# Patient Record
Sex: FEMALE | Employment: OTHER | URBAN - METROPOLITAN AREA
[De-identification: names, ages, dates, MRNs, and addresses within clinical notes are randomized per-mention and may not be internally consistent; named-entity substitution may affect disease eponyms.]

---

## 2017-02-23 ENCOUNTER — CARE COORDINATION (OUTPATIENT)
Dept: CASE MANAGEMENT | Facility: CLINIC | Age: 62
End: 2017-02-23

## 2017-03-01 ENCOUNTER — CARE COORDINATION (OUTPATIENT)
Dept: CASE MANAGEMENT | Facility: CLINIC | Age: 62
End: 2017-03-01

## 2017-03-15 ENCOUNTER — CARE COORDINATION (OUTPATIENT)
Dept: CASE MANAGEMENT | Facility: CLINIC | Age: 62
End: 2017-03-15

## 2017-04-06 ENCOUNTER — CARE COORDINATION (OUTPATIENT)
Dept: CASE MANAGEMENT | Facility: CLINIC | Age: 62
End: 2017-04-06

## 2017-06-06 ENCOUNTER — CARE COORDINATION (OUTPATIENT)
Dept: CASE MANAGEMENT | Facility: CLINIC | Age: 62
End: 2017-06-06

## 2017-06-06 NOTE — PROGRESS NOTES
Clinic Care Coordination Contact  OUTREACH    Referral Information:  Referral Source: PCP  Reason for Contact: Introducing new CC  Care Conference: No     Universal Utilization:   ED Visits in last year: 0  Hospital visits in last year: 0  Last PCP appointment: 03/01/17  Missed Appointments: 1      Clinical Concerns:  Current Medical Concerns: not identified   Current Behavioral Concerns: not identified  Education Provided to patient: none provided    Medication Management:  Not addressed.    Functional Status:  Mobility Status: Independent  Equipment Currently Used at Home: none  Transportation: she drives herself     Psychosocial:  Current living arrangement:: I live alone.      Resources and Interventions:    Advanced Care Plans/Directives on file:: No  Referrals Placed:  (na)  Barriers: none identified  Strengths: Has appointment next week with PCP and will try to meet with CC then. She is driving and could not identify the date and time.   Patient/Caregiver understanding: Patient understands the plan.   Frequency of Care Coordination: every 2-3 weeks  Upcoming appointment: 06/13/17     Plan: Meet with patient at PCP appointment next week.

## 2017-06-13 ENCOUNTER — CARE COORDINATION (OUTPATIENT)
Dept: CASE MANAGEMENT | Facility: CLINIC | Age: 62
End: 2017-06-13

## 2017-07-07 ENCOUNTER — CARE COORDINATION (OUTPATIENT)
Dept: CASE MANAGEMENT | Facility: CLINIC | Age: 62
End: 2017-07-07

## 2017-07-31 ENCOUNTER — CARE COORDINATION (OUTPATIENT)
Dept: CARE COORDINATION | Facility: CLINIC | Age: 62
End: 2017-07-31

## 2021-02-15 ENCOUNTER — TRANSFERRED RECORDS (OUTPATIENT)
Dept: HEALTH INFORMATION MANAGEMENT | Facility: CLINIC | Age: 66
End: 2021-02-15

## 2021-02-15 LAB — HBA1C MFR BLD: 12 % (ref 4.8–5.6)

## 2021-02-16 NOTE — PROGRESS NOTES
> From: TERESO MENON   > To: DIMPLE, PHARM D   > Sent: 2/16/2021 3:18 PM  >  prior attempts to contact didn't succeed.  a consult would be good.  (note, she lost her adult son and brother to covid in 2020, so it was a really tough year)    Dr. Marily PEDROZAFP

## 2021-02-23 ENCOUNTER — OFFICE VISIT (OUTPATIENT)
Dept: PHARMACY | Facility: PHYSICIAN GROUP | Age: 66
End: 2021-02-23

## 2021-02-23 DIAGNOSIS — E11.69 TYPE 2 DIABETES MELLITUS WITH OTHER SPECIFIED COMPLICATION, WITH LONG-TERM CURRENT USE OF INSULIN (H): Primary | ICD-10-CM

## 2021-02-23 DIAGNOSIS — Z79.4 TYPE 2 DIABETES MELLITUS WITH OTHER SPECIFIED COMPLICATION, WITH LONG-TERM CURRENT USE OF INSULIN (H): Primary | ICD-10-CM

## 2021-02-23 PROCEDURE — 99207 PR NO CHARGE LOS: CPT | Performed by: PHARMACIST

## 2021-02-23 ASSESSMENT — MIFFLIN-ST. JEOR: SCORE: 1545.86

## 2021-02-23 NOTE — PROGRESS NOTES
Clinical Pharmacy Consult:                                                    Dayami Saldaña is a 65 year old female coming in for a clinical pharmacist consult.  She was referred to me from Dr. Calixto.     Reason for Consult: Uncontrolled diabetes.  Patient's insurance does not cover MTM visits.  She opts for an MTM consult for diabetes education.    Adherence: Patient has had a very stressful year.  Her son and brother both  due to heart complications after Covid.  Patient reports in her grief she stopped caring about her health.  Recently she realized that uncontrolled diabetes was causing her to feel physically unwell and she is working on getting back on track.  Patient was off the Victoza for a period of time and recently restarted it.  She has not started taking Jardiance because wants to focus on taking her current medications as prescribed rather than adding new medications.  She also expresses concern about the side effects of Jardiance, however does not wish to discuss this further today because she is not ready to start it at this time. Patient asks if insulin and Victoza can be taken at the same time.  She's been trying to space the injections apart from each other which makes administration timing difficult.      Type 2 Diabetes:  Currently taking Basaglar 40mg at bedtime, Humalog 10 units 3 times daily with meals, and Victoza 1.8mg daily. Patient is not experiencing side effects, denies any nausea or GI issues with Victoza dose increase.  Blood sugar monitoring: Continuous Glucose Monitor - Freestyle Lexie, prefers to use reader instead of cell phone. Time in ranges: 68% very high, 22% high, 10% in target range, 0% low, 0% very low.  Average glucose: 296 mg/dL. Time CGM is active 37%.      Symptoms of low blood sugar? none  Symptoms of high blood sugar? fatigue and neuropathy.   Diet/Exercise: Yesterday was a reset for her to get back on track with her health. Started going back to [employer] and eating more vegetables/fruit.  Aspirin: Taking 81mg daily and denies side effects  Statin: Taking rosuvastatin 10 mg daily.  ACEi/ARB: Taking losartan 100 mg daily.  Last microalbumin was >300 mg/g on 11/23/2020.  Last A1c was 12.0% on 2/15/2021.  Previous A1c was 10.7% on 11/23/2020.  Last BMP on 2/15/2021: SCr 0.99 mg/dL, eGFR 70 mL/min, and electrolytes WNL.    Today's Vitals: /80   Pulse 86   Ht 5' 3\" (1.6 m)   Wt 225 lb 4 oz (102.2 kg)   BMI 39.90 kg/m      Plan:  1.  Patient to continue current diabetes regimen including: Basaglar 40 units daily, Humalog 10 units 3 times daily with meals, and Victoza 1.8 mg daily. Patient to work on maintaining medication adherence and healthy lifestyle modifications.   2. Patient education provided today. It's ok to inject Victoza and insulin at the same time.  Must scan blood sugars at least every 8 hours in order to capture all data.  3. Follow-up with Dr. Calixto in 2 weeks to recheck blood sugars and MTM as needed.      Yahaira Billy, PharmD  Medication Therapy Management Pharmacist  Pager: [phone]  "

## 2021-02-28 RX ORDER — AMLODIPINE BESYLATE 10 MG/1
10 TABLET ORAL DAILY
COMMUNITY

## 2021-02-28 RX ORDER — ASPIRIN 81 MG/1
81 TABLET ORAL DAILY
COMMUNITY

## 2021-02-28 RX ORDER — FLASH GLUCOSE SENSOR
KIT MISCELLANEOUS
COMMUNITY

## 2021-02-28 RX ORDER — INSULIN GLARGINE 100 [IU]/ML
40 INJECTION, SOLUTION SUBCUTANEOUS DAILY
COMMUNITY

## 2021-02-28 RX ORDER — LIRAGLUTIDE 6 MG/ML
1.8 INJECTION SUBCUTANEOUS DAILY
COMMUNITY

## 2021-02-28 RX ORDER — LOSARTAN POTASSIUM AND HYDROCHLOROTHIAZIDE 25; 100 MG/1; MG/1
1 TABLET ORAL DAILY
COMMUNITY

## 2021-02-28 RX ORDER — ROSUVASTATIN CALCIUM 10 MG/1
10 TABLET, COATED ORAL DAILY
COMMUNITY

## 2021-02-28 RX ORDER — INSULIN LISPRO 100 [IU]/ML
10 INJECTION, SOLUTION INTRAVENOUS; SUBCUTANEOUS
COMMUNITY

## 2021-04-15 ENCOUNTER — PATIENT OUTREACH (OUTPATIENT)
Dept: CARE COORDINATION | Facility: CLINIC | Age: 66
End: 2021-04-15

## 2021-04-15 DIAGNOSIS — E11.9 TYPE 2 DIABETES MELLITUS (H): Primary | ICD-10-CM

## 2021-04-15 ASSESSMENT — ACTIVITIES OF DAILY LIVING (ADL): DEPENDENT_IADLS:: INDEPENDENT

## 2021-04-15 NOTE — PROGRESS NOTES
Clinic Care Coordination Contact    Clinic Care Coordination Contact  OUTREACH    Referral Information:  Referral Source: MTM         Chief Complaint   Patient presents with     Clinic Care Coordination - Initial        Universal Utilization: na  Clinic Utilization  Difficulty keeping appointments:: No  Compliance Concerns: No  No-Show Concerns: No  No PCP office visit in Past Year: No  Utilization    Last refreshed: 4/15/2021 11:05 AM: Hospital Admissions 0           Last refreshed: 4/15/2021 11:05 AM: ED Visits 0           Last refreshed: 4/15/2021 11:05 AM: No Show Count (past year) 0              Current as of: 4/15/2021 11:05 AM              Clinical Concerns:  Current Medical Concerns:  Type 11 Diabetes    Current Behavioral Concerns: depression due to loss of son and brother    Education Provided to patient: Good Rx and [place]   Pain  Pain (GOAL):: No  Health Maintenance Reviewed: Up to date  Clinical Pathway: None    Medication Management:Humalog 10 units 3 times daily with meals, and Victoza 1.8 mg daily.    Functional Status:  Dependent ADLs:: Independent  Dependent IADLs:: Independent  Bed or wheelchair confined:: No  Fallen 2 or more times in the past year?: No  Any fall with injury in the past year?: No    Living Situation:  Current living arrangement:: I live alone  Type of residence:: Apartment    Lifestyle & Psychosocial Needs:        Diet:: Diabetic diet  Inadequate nutrition (GOAL):: No  Tube Feeding: No  Significant changes in sleep pattern (GOAL): No  Transportation means:: Regular car     Yarsani or spiritual beliefs that impact treatment:: No  Mental health DX:: No  Mental health management concern (GOAL):: No  Chemical Dependency Status: No Current Concerns   Socioeconomic History     Marital status: Single     Spouse name: Not on file     Number of children: Not on file     Years of education: Not on file     Highest education level: Not on file                Resources  and Interventions:  Current Resources:  Shared [place] Insulin Safety net program and Good rx for Proaire and Freestyle monitor     Community Resources: None  Supplies used at home:: Diabetic Supplies  Equipment Currently Used at Home: none  Employment Status: employed part-time)   )         Referrals Placed: Community Resources, Financial Services     Goals:       Patient/Caregiver understanding: yes    Outreach Frequency: 2 weeks      Plan: [facility] [place] phoned Dayami to disccus current inssurance lapst. She states that she currently does not have insurance but should have M[place]ure May 1. Dayami stated that she will run out of insulin by May 1rst. She does not have the Freestyle monitor b/c she needed to pay for other medications before that. She would like to have that filled if possible. She is also in eed of Basaglar and Humalog as well as the Proair inhaler.    [facility] called her pharmacy[facility] in [place] and asked for their patient assistance program. They had no suggestions except Good Rx. Roberts Chapel researched and found the [place] Insulin Safety Net Program and informed her that I would be emailing her the application to bring to her pharmacy. I emailed Dayami Good Rx coupons for Proaire as well as for her FreeStyle Monitor.    Plan:Dayami to utilize [place] program application and Good Rx coupons , 2 [facility] [place] to follow up next week.

## 2021-04-22 ENCOUNTER — PATIENT OUTREACH (OUTPATIENT)
Dept: CARE COORDINATION | Facility: CLINIC | Age: 66
End: 2021-04-22

## 2021-04-22 NOTE — PROGRESS NOTES
Clinic Care Coordination Contact  [facility]/Voicemail       Clinical Data: Care Coordinator Outreach  Outreach attempted x 1.  Left message on patient's voicemail with call back information and requested return call.  Care Coordinator will try to reach patient again in 3-5 business days.

## 2021-04-29 ENCOUNTER — PATIENT OUTREACH (OUTPATIENT)
Dept: CARE COORDINATION | Facility: CLINIC | Age: 66
End: 2021-04-29

## 2021-04-29 NOTE — PROGRESS NOTES
Clinic Care Coordination Contact  [facility]/Voicemail       Clinical Data: Care Coordinator Outreach  Outreach attempted x 2.  Left message on patient's voicemail with call back information and requested return call.  . Care Coordinator will try to reach patient again in 3-5 business days.

## 2021-05-10 ENCOUNTER — TRANSFERRED RECORDS (OUTPATIENT)
Dept: HEALTH INFORMATION MANAGEMENT | Facility: CLINIC | Age: 66
End: 2021-05-10

## 2021-05-10 LAB — HBA1C MFR BLD: 10.6 % (ref 4.8–5.6)

## 2021-05-11 ENCOUNTER — PATIENT OUTREACH (OUTPATIENT)
Dept: CARE COORDINATION | Facility: CLINIC | Age: 66
End: 2021-05-11

## 2021-05-11 NOTE — PROGRESS NOTES
Clinic Care Coordination Contact  [facility]/Voicemail       Clinical Data: Care Coordinator Outreach  Outreach attempted x 3.  Left message on patient's voicemail with call back information and requested return call.  Care Coordinator will do no further outreaches at this time.

## 2021-08-03 ENCOUNTER — THERAPY VISIT (OUTPATIENT)
Dept: PHYSICAL THERAPY | Facility: CLINIC | Age: 66
End: 2021-08-03
Payer: COMMERCIAL

## 2021-08-03 DIAGNOSIS — M54.50 BILATERAL LOW BACK PAIN WITHOUT SCIATICA: ICD-10-CM

## 2021-08-03 PROCEDURE — 97110 THERAPEUTIC EXERCISES: CPT | Mod: GP | Performed by: PHYSICAL THERAPIST

## 2021-08-03 PROCEDURE — 97161 PT EVAL LOW COMPLEX 20 MIN: CPT | Mod: GP | Performed by: PHYSICAL THERAPIST

## 2021-08-03 PROCEDURE — 97112 NEUROMUSCULAR REEDUCATION: CPT | Mod: GP | Performed by: PHYSICAL THERAPIST

## 2021-08-03 NOTE — PROGRESS NOTES
Physical Therapy Initial Evaluation  Subjective:    Patient Health History  Dayami Cruz being seen for low back pain.     Problem began: 7/12/2021 (fell).   Problem occurred: 7/12/21 while in [employer] mobile walking to the restroom was looking up to see where to go and didn't see a stack of glossy posters on the floor and slipped and fell and landed completely on back.  Knew would be sore the next day so used ice the next day.  (Did have LBP in 2009 and therapy helped and was fine until this fall).  Also noting R ankle swelling since the fall.   Pain is reported as 8/10 on pain scale.  General health as reported by patient is good.  Pertinent medical history includes: high blood pressure, diabetes and overweight (R achilles rupture 2018- in boot for many months (thinks this fall has irritated the R ankle- swelling in am)).   Red flags:  Pain at rest/night.  Medical allergies: none.   Surgeries include:  None.    Current medications:  High blood pressure medication (Diabetes meds).    Current occupation is [employer].   Primary job tasks include:  Prolonged standing.                  Therapist Generated HPI Evaluation         Type of problem:  Lumbar.    This is a new condition.      Patient reports pain:  Lumbar spine right and lumbar spine left (R>L).  Pain is described as shooting and aching and is intermittent (constant/consistent by end of day, feels good in morning).  Pain radiates to:  Gluteals right and gluteals left (R>L). Pain is worse in the P.M..  Since onset symptoms are gradually worsening.  Associated symptoms:  Loss of motion/stiffness and loss of strength (fears falling down stairs ). Symptoms are exacerbated by sitting and standing (navigating stairs one step at a time and almost pulling railing off wall, usually works 8-12 hours, right now only doing 5 as not able to stand (standing 30-45 min max and needs to sit))  and relieved by ice, other and rest (putting breaks between clients at work,  massage).  Special tests included:  X-ray (mild arthritis L4-5).    Restrictions due to condition include:  Working in normal job without restrictions (self limiting, putting breaks between patients).  Barriers include:  Stairs (10-12 stairs in home and stairs to enter home (railing on R), lives with ).                        Objective:  System         Lumbar/SI Evaluation  ROM:    AROM Lumbar:   Flexion:          Mod/max loss reach to above knees, pain LB  Ext:                    Mod max loss pain to LB and buttocks   Side Bend:        Left:     Right:   Rotation:           Left:     Right:   Side Glide:        Left:  Min loss tightnes    Right:  Min loss          Lumbar Myotomes:  Lumbar myotomes: submaximal effort due to pain, breaks with all testing R>L.  T12-L3 (Hip Flex):  Left: 4    Right: 4-  L2-4 (Quads):  Left:  4    Right:  4-  L4 (Ankle DF):  Left:  4    Right:  3  L5 (Great Toe Ext): Left: 4    Right: 3   S1 (Toe Raise):  Left: 4    Right: 3-      Lumbar Dermtomes:  not assessed                Neural Tension/Mobility:      Left side:SLR or Slump  negative.   Right side:   SLR positive.  Right side:   Slump  negative.   Lumbar Palpation:    Tenderness present at Left:    Quadratus Lumborum and Erector Spinae  Tenderness present at Right: Quadratus Lumborum and Erector Spinae                                                       Miles Lumbar Evaluation    Posture:  Sitting: fair  Standing: fair    Lateral Shift: no  Correction of Posture: better  Other Observations: ZHAO unable to bring both knees up.  SKC painful no different after.  Reports painful at home if tries to lie prone    Test Movements:  FIS: During: increases  After: no worse  Pretest Movements: B LBP    EIS: During: increases  After: no worse    Repeat EIS: During: increases  After: no worse  Mechanical Response: no effect            Conclusion: trauma  Principle of Treatment:  Posture Correction: Educate keep neutral spine, use of  lumbar support, slouch/over-correct, over-correct and back off 10% to neutral unsupported sitting, pay attention if posture affects pain.        Other: Slouch/over-correct, hooklying rotation, supine abdominal #1 core stabilization, self MFR with tennis balls to B erector spinae                                       ROS    Assessment/Plan:    Patient is a 66 year old female with lumbar complaints.    Patient has the following significant findings with corresponding treatment plan.                Diagnosis 1:  Lower back pain after a fall    Pain -  hot/cold therapy, manual therapy, self management, education and home program  Decreased ROM/flexibility - manual therapy, therapeutic exercise, therapeutic activity and home program  Decreased strength - therapeutic exercise, therapeutic activities and home program  Decreased function - therapeutic activities and home program  Impaired posture - neuro re-education, therapeutic activities and home program    Therapy Evaluation Codes:   Cumulative Therapy Evaluation is: Low complexity.    Previous and current functional limitations:  (See Goal Flow Sheet for this information)    Short term and Long term goals: (See Goal Flow Sheet for this information)     Communication ability:  Patient appears to be able to clearly communicate and understand verbal and written communication and follow directions correctly.  Treatment Explanation - The following has been discussed with the patient:   RX ordered/plan of care  Anticipated outcomes  Possible risks and side effects  This patient would benefit from PT intervention to resume normal activities.   Rehab potential is good.    Frequency:  1 X week, once daily  Duration:  for 6 weeks  Discharge Plan:  Achieve all LTG.  Independent in home treatment program.  Reach maximal therapeutic benefit.    Please refer to the daily flowsheet for treatment today, total treatment time and time spent performing 1:1 timed codes.

## 2021-08-03 NOTE — LETTER
[facility]  [address]  [place] [address]  [phone]    2021    Re: Dayami Cruz   :   1955  MRN:  [id number]   REFERRING PHYSICIAN:   Linda MURRELL [facility]  Date of Initial Evaluation:  8/3/21  Visits:  Rxs Used: 1  Reason for Referral:  Bilateral low back pain without sciatica    EVALUATION SUMMARY  Physical Therapy Initial Evaluation  Subjective:  Patient Health History  Dayami Cruz being seen for low back pain.   Problem began: 2021 (fell).   Problem occurred: 21 while in T mobile walking to the restroom was looking up to see where to go and didn't see a stack of glossy posters on the floor and slipped and fell and landed completely on back.  Knew would be sore the next day so used ice the next day.  (Did have LBP in  and therapy helped and was fine until this fall).  Also noting R ankle swelling since the fall.   Pain is reported as 8/10 on pain scale.  General health as reported by patient is good.  Pertinent medical history includes: high blood pressure, diabetes and overweight (R achilles rupture 2018- in boot for many months (thinks this fall has irritated the R ankle- swelling in am)).   Red flags:  Pain at rest/night.  Medical allergies: none.   Surgeries include:  None.    Current medications:  High blood pressure medication (Diabetes meds).    Current occupation is [employer].   Primary job tasks include:  Prolonged standing.                Therapist Generated HPI Evaluation  Type of problem:  Lumbar.  This is a new condition.  Patient reports pain:  Lumbar spine right and lumbar spine left (R>L).  Pain is described as shooting and aching and is intermittent (constant/consistent by end of day, feels good in morning).  Pain radiates to:  Gluteals right and gluteals left (R>L). Pain is worse in the P.M..  Since onset  symptoms are gradually worsening.  Associated symptoms:  Loss of motion/stiffness and loss of strength (fears falling down stairs ). Symptoms are exacerbated by sitting and standing (navigating stairs one step at a time and almost pulling railing off wall, usually works 8-12 hours, right now only doing 5 as not able to stand (standing 30-45 min max and needs to sit))  and relieved by ice, other and rest (putting breaks between clients at work, massage).  Special tests included:  X-ray (mild arthritis L4-5).  Re: Dayami Cruz   :   1955    Restrictions due to condition include:  Working in normal job without restrictions (self limiting, putting breaks between patients).  Barriers include:  Stairs (10-12 stairs in home and stairs to enter home (railing on R), lives with ).         Lumbar/SI Evaluation  ROM:    AROM Lumbar:   Flexion:          Mod/max loss reach to above knees, pain LB  Ext:                    Mod max loss pain to LB and buttocks   Side Glide:        Left:  Min loss tightnes    Right:  Min loss  Lumbar Myotomes:  Lumbar myotomes: submaximal effort due to pain, breaks with all testing R>L.  T12-L3 (Hip Flex):  Left: 4    Right: 4-  L2-4 (Quads):  Left:  4    Right:  4-  L4 (Ankle DF):  Left:  4    Right:  3  L5 (Great Toe Ext): Left: 4    Right: 3   S1 (Toe Raise):  Left: 4    Right: 3-  Lumbar Dermtomes:  not assessed  Neural Tension/Mobility:    Left side:SLR or Slump  negative.   Right side:   SLR positive.  Right side:   Slump  negative.   Lumbar Palpation:    Tenderness present at Left:    Quadratus Lumborum and Erector Spinae  Tenderness present at Right: Quadratus Lumborum and Erector Spinae    Miles Lumbar Evaluation  Posture:  Sitting: fair  Standing: fair  Lateral Shift: no  Correction of Posture: better  Other Observations: ZHAO unable to bring both knees up.  SKC painful no different after.  Reports painful at home if tries to lie prone  Test Movements:  FIS: During:  increases  After: no worse  Pretest Movements: B LBP  EIS: During: increases  After: no worse    Repeat EIS: During: increases  After: no worse  Mechanical Response: no effect  Conclusion: trauma  Principle of Treatment:  Posture Correction: Educate keep neutral spine, use of lumbar support, slouch/over-correct, over-correct and back off 10% to neutral unsupported sitting, pay attention if posture affects pain.  Other: Slouch/over-correct, hooklying rotation, supine abdominal #1 core stabilization, self MFR with tennis balls to B erector spinae            Assessment/Plan:    Patient is a 66 year old female with lumbar complaints.    Patient has the following significant findings with corresponding treatment plan.                Diagnosis 1:  Lower back pain after a fall  Pain -  hot/cold therapy, manual therapy, self management, education and home program  Decreased ROM/flexibility - manual therapy, therapeutic exercise, therapeutic activity and home program  Decreased strength - therapeutic exercise, therapeutic activities and home program  Decreased function - therapeutic activities and home program  Impaired posture - neuro re-education, therapeutic activities and home program    Therapy Evaluation Codes:   Cumulative Therapy Evaluation is: Low complexity.  Previous and current functional limitations:  (See Goal Flow Sheet for this information)    Short term and Long term goals: (See Goal Flow Sheet for this information)     Communication ability:  Patient appears to be able to clearly communicate and understand verbal and written communication and follow directions correctly.  Treatment Explanation - The following has been discussed with the patient:     RX ordered/plan of care  Anticipated outcomes  Possible risks and side effects  This patient would benefit from PT intervention to resume normal activities.   Rehab potential is good.    Frequency:  1 X week, once daily  Duration:  for 6 weeks  Discharge Plan:   Achieve all LTG.  Independent in home treatment program.  Reach maximal therapeutic benefit.    Thank you for your referral.    INQUIRIES  Therapist: Ana Navarro PT  [facility]  [address] [address]  Phone: [phone]  Fax: [phone]

## 2021-08-09 ENCOUNTER — THERAPY VISIT (OUTPATIENT)
Dept: PHYSICAL THERAPY | Facility: CLINIC | Age: 66
End: 2021-08-09
Payer: COMMERCIAL

## 2021-08-09 DIAGNOSIS — M54.50 ACUTE BILATERAL LOW BACK PAIN WITHOUT SCIATICA: ICD-10-CM

## 2021-08-09 PROCEDURE — 97110 THERAPEUTIC EXERCISES: CPT | Mod: GP | Performed by: PHYSICAL THERAPIST

## 2021-08-09 PROCEDURE — 97140 MANUAL THERAPY 1/> REGIONS: CPT | Mod: GP | Performed by: PHYSICAL THERAPIST

## 2021-08-09 PROCEDURE — 97112 NEUROMUSCULAR REEDUCATION: CPT | Mod: GP | Performed by: PHYSICAL THERAPIST

## 2021-08-30 ENCOUNTER — THERAPY VISIT (OUTPATIENT)
Dept: PHYSICAL THERAPY | Facility: CLINIC | Age: 66
End: 2021-08-30
Payer: COMMERCIAL

## 2021-08-30 DIAGNOSIS — M54.50 ACUTE BILATERAL LOW BACK PAIN WITHOUT SCIATICA: ICD-10-CM

## 2021-08-30 PROCEDURE — 97110 THERAPEUTIC EXERCISES: CPT | Mod: GP | Performed by: PHYSICAL THERAPIST

## 2021-08-30 PROCEDURE — 97112 NEUROMUSCULAR REEDUCATION: CPT | Mod: GP | Performed by: PHYSICAL THERAPIST

## 2021-08-30 PROCEDURE — 97140 MANUAL THERAPY 1/> REGIONS: CPT | Mod: GP | Performed by: PHYSICAL THERAPIST

## 2021-09-29 ENCOUNTER — TRANSFERRED RECORDS (OUTPATIENT)
Dept: HEALTH INFORMATION MANAGEMENT | Facility: CLINIC | Age: 66
End: 2021-09-29
Payer: COMMERCIAL

## 2021-09-29 LAB — HBA1C MFR BLD: 8.9 % (ref 4.8–5.6)

## 2021-10-15 PROBLEM — M54.50 BILATERAL LOW BACK PAIN WITHOUT SCIATICA: Status: RESOLVED | Noted: 2021-08-03 | Resolved: 2021-10-15

## 2021-10-15 NOTE — PROGRESS NOTES
Discharge Note    Progress reporting period is from initial evaluation date (please see noted date below) to Aug 30, 2021.  Linked Episodes   Type: Episode: Status: Noted: Resolved: Last update: Updated by:   PHYSICAL THERAPY LBP s/p fall 8/3/21 Active 8/3/2021  8/30/2021 11:29 AM Ana Navarro PT      Comments:       Dayami failed to follow up and current status is unknown.  Please see information below for last relevant information on current status.  Patient seen for 3 visits.    SUBJECTIVE  Subjective changes noted by patient:  I have been doing a little travel.  I have some general tightness.  I have had some swelling in the right shoulder.  I see the MD on 9/29/2021.  only a couple of spasms in the back .  I had to take some tylenol, low back and up the right side.    .  Current pain level is 7/10.     Previous pain level was  8/10.   Changes in function:  Yes (See Goal flowsheet attached for changes in current functional level)  Adverse reaction to treatment or activity: None    OBJECTIVE  Changes noted in objective findings: TROM flexion knees, extension moderate      ASSESSMENT/PLAN  Diagnosis: R>L Lower back pain s/p fall   Updated problem list and treatment plan:   Pain - HEP  Decreased ROM/flexibility - HEP  Decreased function - HEP  STG/LTGs have been met or progress has been made towards goals:  Yes, please see goal flowsheet for most current information  Assessment of Progress: current status is unknown.    Last current status: Pt is progressing slower than anticipated   Self Management Plans:  HEP  I have re-evaluated this patient and find that the nature, scope, duration and intensity of the therapy is appropriate for the medical condition of the patient.  Dayami continues to require the following intervention to meet STG and LTG's:  HEP.    Recommendations:  Discharge with current home program.  Patient to follow up with MD as needed.    Please refer to the daily flowsheet for treatment today, total  treatment time and time spent performing 1:1 timed codes.

## 2022-01-25 ENCOUNTER — TRANSFERRED RECORDS (OUTPATIENT)
Dept: HEALTH INFORMATION MANAGEMENT | Facility: CLINIC | Age: 67
End: 2022-01-25
Payer: COMMERCIAL

## 2022-01-25 LAB — HBA1C MFR BLD: 11.2 % (ref 4.8–5.6)

## 2022-02-28 VITALS
BODY MASS INDEX: 39.91 KG/M2 | HEIGHT: 63 IN | WEIGHT: 225.25 LBS | SYSTOLIC BLOOD PRESSURE: 144 MMHG | HEART RATE: 86 BPM | DIASTOLIC BLOOD PRESSURE: 90 MMHG

## 2022-04-26 ENCOUNTER — TRANSFERRED RECORDS (OUTPATIENT)
Dept: HEALTH INFORMATION MANAGEMENT | Facility: CLINIC | Age: 67
End: 2022-04-26
Payer: COMMERCIAL

## 2022-04-26 LAB — HBA1C MFR BLD: 10.6 % (ref 4.8–5.6)

## 2022-05-31 ENCOUNTER — TELEPHONE (OUTPATIENT)
Dept: PHARMACY | Facility: PHYSICIAN GROUP | Age: 67
End: 2022-05-31
Payer: COMMERCIAL

## 2022-05-31 NOTE — TELEPHONE ENCOUNTER
I called patient to offer an MTM visit. Left a voicemail to call clinic back to schedule appt.    Yahaira Billy, PharmD  Medication Therapy Management Pharmacist  [phone]

## 2022-08-09 ENCOUNTER — TRANSFERRED RECORDS (OUTPATIENT)
Dept: HEALTH INFORMATION MANAGEMENT | Facility: CLINIC | Age: 67
End: 2022-08-09

## 2022-08-09 LAB — HBA1C MFR BLD: 9.8 % (ref 4.8–5.6)

## 2024-10-10 ENCOUNTER — TRANSFERRED RECORDS (OUTPATIENT)
Dept: HEALTH INFORMATION MANAGEMENT | Facility: CLINIC | Age: 69
End: 2024-10-10
Payer: COMMERCIAL

## 2024-10-14 ENCOUNTER — MEDICAL CORRESPONDENCE (OUTPATIENT)
Dept: HEALTH INFORMATION MANAGEMENT | Facility: CLINIC | Age: 69
End: 2024-10-14
Payer: COMMERCIAL

## 2024-10-14 DIAGNOSIS — R79.89 ELEVATED BRAIN NATRIURETIC PEPTIDE (BNP) LEVEL: Primary | ICD-10-CM

## 2024-10-14 DIAGNOSIS — R60.0 PERIPHERAL EDEMA: ICD-10-CM

## 2024-10-15 ENCOUNTER — TELEPHONE (OUTPATIENT)
Dept: CARDIOLOGY | Facility: CLINIC | Age: 69
End: 2024-10-15
Payer: COMMERCIAL

## 2024-10-15 NOTE — TELEPHONE ENCOUNTER
2nd attempt- Left voicemail for the patient to call back and schedule the following:    Appointment type:  Testing only- No clinic visit    Additional appointment(s) needed:  Echocardiogram    Specialty phone number: [phone]